# Patient Record
Sex: FEMALE | Race: WHITE | URBAN - METROPOLITAN AREA
[De-identification: names, ages, dates, MRNs, and addresses within clinical notes are randomized per-mention and may not be internally consistent; named-entity substitution may affect disease eponyms.]

---

## 2018-08-20 ENCOUNTER — EMERGENCY (EMERGENCY)
Facility: HOSPITAL | Age: 28
LOS: 0 days | Discharge: HOME | End: 2018-08-20
Attending: EMERGENCY MEDICINE | Admitting: EMERGENCY MEDICINE

## 2018-08-20 VITALS
HEART RATE: 66 BPM | RESPIRATION RATE: 18 BRPM | SYSTOLIC BLOOD PRESSURE: 101 MMHG | DIASTOLIC BLOOD PRESSURE: 54 MMHG | OXYGEN SATURATION: 100 % | TEMPERATURE: 97 F

## 2018-08-20 VITALS
HEART RATE: 72 BPM | TEMPERATURE: 97 F | DIASTOLIC BLOOD PRESSURE: 67 MMHG | OXYGEN SATURATION: 100 % | RESPIRATION RATE: 20 BRPM | SYSTOLIC BLOOD PRESSURE: 121 MMHG

## 2018-08-20 DIAGNOSIS — R55 SYNCOPE AND COLLAPSE: ICD-10-CM

## 2018-08-20 DIAGNOSIS — R06.02 SHORTNESS OF BREATH: ICD-10-CM

## 2018-08-20 LAB
ALBUMIN SERPL ELPH-MCNC: 4.7 G/DL — SIGNIFICANT CHANGE UP (ref 3.5–5.2)
ALP SERPL-CCNC: 84 U/L — SIGNIFICANT CHANGE UP (ref 30–115)
ALT FLD-CCNC: 17 U/L — SIGNIFICANT CHANGE UP (ref 0–41)
ANION GAP SERPL CALC-SCNC: 14 MMOL/L — SIGNIFICANT CHANGE UP (ref 7–14)
APPEARANCE UR: CLEAR — SIGNIFICANT CHANGE UP
AST SERPL-CCNC: 20 U/L — SIGNIFICANT CHANGE UP (ref 0–41)
BASOPHILS # BLD AUTO: 0.04 K/UL — SIGNIFICANT CHANGE UP (ref 0–0.2)
BASOPHILS NFR BLD AUTO: 0.3 % — SIGNIFICANT CHANGE UP (ref 0–1)
BILIRUB SERPL-MCNC: 0.2 MG/DL — SIGNIFICANT CHANGE UP (ref 0.2–1.2)
BILIRUB UR-MCNC: NEGATIVE — SIGNIFICANT CHANGE UP
BUN SERPL-MCNC: 16 MG/DL — SIGNIFICANT CHANGE UP (ref 10–20)
CALCIUM SERPL-MCNC: 9.2 MG/DL — SIGNIFICANT CHANGE UP (ref 8.5–10.1)
CHLORIDE SERPL-SCNC: 101 MMOL/L — SIGNIFICANT CHANGE UP (ref 98–110)
CO2 SERPL-SCNC: 22 MMOL/L — SIGNIFICANT CHANGE UP (ref 17–32)
COLOR SPEC: YELLOW — SIGNIFICANT CHANGE UP
CREAT SERPL-MCNC: 0.8 MG/DL — SIGNIFICANT CHANGE UP (ref 0.7–1.5)
D DIMER BLD IA.RAPID-MCNC: 127 NG/ML DDU — SIGNIFICANT CHANGE UP (ref 0–230)
DIFF PNL FLD: NEGATIVE — SIGNIFICANT CHANGE UP
EOSINOPHIL # BLD AUTO: 0.01 K/UL — SIGNIFICANT CHANGE UP (ref 0–0.7)
EOSINOPHIL NFR BLD AUTO: 0.1 % — SIGNIFICANT CHANGE UP (ref 0–8)
GLUCOSE SERPL-MCNC: 90 MG/DL — SIGNIFICANT CHANGE UP (ref 70–99)
GLUCOSE UR QL: NEGATIVE MG/DL — SIGNIFICANT CHANGE UP
HCT VFR BLD CALC: 42 % — SIGNIFICANT CHANGE UP (ref 37–47)
HGB BLD-MCNC: 14.1 G/DL — SIGNIFICANT CHANGE UP (ref 12–16)
IMM GRANULOCYTES NFR BLD AUTO: 0.7 % — HIGH (ref 0.1–0.3)
KETONES UR-MCNC: NEGATIVE — SIGNIFICANT CHANGE UP
LEUKOCYTE ESTERASE UR-ACNC: NEGATIVE — SIGNIFICANT CHANGE UP
LYMPHOCYTES # BLD AUTO: 1.01 K/UL — LOW (ref 1.2–3.4)
LYMPHOCYTES # BLD AUTO: 6.9 % — LOW (ref 20.5–51.1)
MCHC RBC-ENTMCNC: 27.7 PG — SIGNIFICANT CHANGE UP (ref 27–31)
MCHC RBC-ENTMCNC: 33.6 G/DL — SIGNIFICANT CHANGE UP (ref 32–37)
MCV RBC AUTO: 82.5 FL — SIGNIFICANT CHANGE UP (ref 81–99)
MONOCYTES # BLD AUTO: 0.52 K/UL — SIGNIFICANT CHANGE UP (ref 0.1–0.6)
MONOCYTES NFR BLD AUTO: 3.6 % — SIGNIFICANT CHANGE UP (ref 1.7–9.3)
NEUTROPHILS # BLD AUTO: 12.89 K/UL — HIGH (ref 1.4–6.5)
NEUTROPHILS NFR BLD AUTO: 88.4 % — HIGH (ref 42.2–75.2)
NITRITE UR-MCNC: NEGATIVE — SIGNIFICANT CHANGE UP
NRBC # BLD: 0 /100 WBCS — SIGNIFICANT CHANGE UP (ref 0–0)
NT-PROBNP SERPL-SCNC: 31 PG/ML — SIGNIFICANT CHANGE UP (ref 0–300)
PH UR: 6 — SIGNIFICANT CHANGE UP (ref 5–8)
PLATELET # BLD AUTO: 279 K/UL — SIGNIFICANT CHANGE UP (ref 130–400)
POTASSIUM SERPL-MCNC: 4.2 MMOL/L — SIGNIFICANT CHANGE UP (ref 3.5–5)
POTASSIUM SERPL-SCNC: 4.2 MMOL/L — SIGNIFICANT CHANGE UP (ref 3.5–5)
PROT SERPL-MCNC: 8.1 G/DL — HIGH (ref 6–8)
PROT UR-MCNC: NEGATIVE MG/DL — SIGNIFICANT CHANGE UP
RBC # BLD: 5.09 M/UL — SIGNIFICANT CHANGE UP (ref 4.2–5.4)
RBC # FLD: 12.4 % — SIGNIFICANT CHANGE UP (ref 11.5–14.5)
SODIUM SERPL-SCNC: 137 MMOL/L — SIGNIFICANT CHANGE UP (ref 135–146)
SP GR SPEC: 1.02 — SIGNIFICANT CHANGE UP (ref 1.01–1.03)
TROPONIN T SERPL-MCNC: <0.01 NG/ML — SIGNIFICANT CHANGE UP
UROBILINOGEN FLD QL: 0.2 MG/DL — SIGNIFICANT CHANGE UP (ref 0.2–0.2)
WBC # BLD: 14.57 K/UL — HIGH (ref 4.8–10.8)
WBC # FLD AUTO: 14.57 K/UL — HIGH (ref 4.8–10.8)

## 2018-08-20 RX ORDER — ONDANSETRON 8 MG/1
4 TABLET, FILM COATED ORAL ONCE
Qty: 0 | Refills: 0 | Status: COMPLETED | OUTPATIENT
Start: 2018-08-20 | End: 2018-08-20

## 2018-08-20 RX ORDER — SODIUM CHLORIDE 9 MG/ML
1000 INJECTION INTRAMUSCULAR; INTRAVENOUS; SUBCUTANEOUS ONCE
Qty: 0 | Refills: 0 | Status: COMPLETED | OUTPATIENT
Start: 2018-08-20 | End: 2018-08-20

## 2018-08-20 RX ORDER — ACETAMINOPHEN 500 MG
650 TABLET ORAL ONCE
Qty: 0 | Refills: 0 | Status: COMPLETED | OUTPATIENT
Start: 2018-08-20 | End: 2018-08-20

## 2018-08-20 RX ORDER — SODIUM CHLORIDE 9 MG/ML
1000 INJECTION, SOLUTION INTRAVENOUS ONCE
Qty: 0 | Refills: 0 | Status: COMPLETED | OUTPATIENT
Start: 2018-08-20 | End: 2018-08-20

## 2018-08-20 RX ADMIN — Medication 650 MILLIGRAM(S): at 11:32

## 2018-08-20 RX ADMIN — ONDANSETRON 4 MILLIGRAM(S): 8 TABLET, FILM COATED ORAL at 11:02

## 2018-08-20 RX ADMIN — SODIUM CHLORIDE 1000 MILLILITER(S): 9 INJECTION INTRAMUSCULAR; INTRAVENOUS; SUBCUTANEOUS at 11:32

## 2018-08-20 RX ADMIN — SODIUM CHLORIDE 1000 MILLILITER(S): 9 INJECTION, SOLUTION INTRAVENOUS at 13:02

## 2018-08-20 RX ADMIN — SODIUM CHLORIDE 1000 MILLILITER(S): 9 INJECTION INTRAMUSCULAR; INTRAVENOUS; SUBCUTANEOUS at 11:02

## 2018-08-20 NOTE — ED PROVIDER NOTE - OBJECTIVE STATEMENT
pt is a 28 yof with no pmhx presenting with syncope while sitting down at 830 this morning. for the past 2-3 weeks pt has been feeling more fatigued, SOB while in [place]. Pt returned from [place] 1 week ago. sx started to worsen, and this morning pt felt a prodrome, and passed out, slumped over in a chair. No head trauma. Pt woke up and immediately had an episode of nonbloody vomit, so she came in today.

## 2018-08-20 NOTE — ED PROVIDER NOTE - MEDICAL DECISION MAKING DETAILS
29 Y/O F NO SIG PMHX S/P SYNCOPE TODAY. LABS REVIEWED. EKG NORMAL. PT IMPROVED CLINICALLY. CASE D/W PMD AND PT TO FOLLOW UP THIS WEEK.

## 2018-08-20 NOTE — ED PROVIDER NOTE - NS ED ROS FT
Eyes:  No visual changes, eye pain or discharge.  ENMT:  No hearing changes, pain, discharge or infections. No neck pain or stiffness.  Cardiac:  No chest pain, SOB or edema. No chest pain with exertion.  Respiratory:  +SOB, -cough  GI:  +vomit - diarrhea  :  No dysuria, frequency or burning.  MS:  No myalgia, muscle weakness, joint pain or back pain.  Neuro:  +headache +weakness +LOC - head trauma  Skin:  No skin rash.   Endocrine: No history of thyroid disease or diabetes.

## 2018-08-20 NOTE — ED ADULT NURSE NOTE - NSIMPLEMENTINTERV_GEN_ALL_ED
Implemented All Universal Safety Interventions:  [place] to call system. Call bell, personal items and telephone within reach. Instruct patient to call for assistance. Room bathroom lighting operational. Non-slip footwear when patient is off stretcher. Physically safe environment: no spills, clutter or unnecessary equipment. Stretcher in lowest position, wheels locked, appropriate side rails in place.

## 2018-08-20 NOTE — ED PROVIDER NOTE - PROGRESS NOTE DETAILS
I personally evaluated the patient. I reviewed the Resident’s or Physician Assistant’s note (as assigned above), and agree with the findings and plan except as documented in my note.  27 Y/O F NO SIG PMHX C/O SYNCOPE TODAY. PT WAS SEATED IN A CHAIR AND BECAME ACUTELY WEAK AND THEN SYNCOPIZED AND FELL TO THE GROUND. PT C/O WEAKNESS X 3 MONTHS. + FATIGUE. PT C/O 2-3 MONTHS OF INTERMITTENT L SIDED CP. CP IS NOT PLEURITIC OR EXERTIONAL. NO SOB OR ROMERO. NO PALPUTATIONS. NO SEIZURE LIKE ACTIVITY WITH SYNCOPE TODAY. NO INCONTINENCE. NO COUGH, FEVER, CHILLS. PT WITH H/O ? RLE VEIN SURGERY. PT RETURNED [place] ONE WEEK AGO. NONSMOKER. NO WEIGHT LOSS. NO LEG EDEMA. PT REPORTS INTERMITTENT R THIGH PAIN AT SITE OF PRIOR SURGERY. VITALS NOTED. ALERT OX3 NAD OP NORMAL. MMM. PERRL. EOMI. NECK SUPPLE. NO JVD. LUNGS CLEAR B/L. RRR S1S2. ABD- SOFT NONTENDER. BACK NONTENDER. Spoke with Dr Sanford. Pt has a hx of passing out during physical exams and history taking. Dr Sanford will see pt in the AM. Pt in ED feeling better after bolus of NS. negative D-Dimer, negative EKG. PT REPORTS FEELING MUCH BETTER. CASE D/W DR. DONNELLY, WILL FOLLOW UP PT IN THE OFFICE. PT AWARE AND WILL SCHEDULE AN APPT. PT GIVEN RETURN INSTRUCTIONS. FAMILY A BEDSIDE.

## 2018-08-20 NOTE — ED PROVIDER NOTE - PHYSICAL EXAMINATION
CONSTITUTIONAL: Somnolent, in no acute distress  SKIN: warm, dry  HEAD: Normocephalic; atraumatic.  EYES: PERRL, EOMI, normal sclera and conjunctiva   ENT: No nasal discharge; airway clear.  NECK: Supple; non tender.  CARD: S1, S2 normal; no murmurs, gallops, or rubs. Regular rate and rhythm.   RESP: No wheezes, rales or rhonchi.  ABD: soft ntnd  EXT: Normal ROM.  No clubbing, cyanosis or edema.   LYMPH: No acute cervical adenopathy.  NEURO: Alert, oriented, grossly unremarkable  PSYCH: Cooperative, appropriate.

## 2021-06-23 ENCOUNTER — TRANSCRIPTION ENCOUNTER (OUTPATIENT)
Age: 31
End: 2021-06-23

## 2021-12-13 ENCOUNTER — TRANSCRIPTION ENCOUNTER (OUTPATIENT)
Age: 31
End: 2021-12-13

## 2022-02-26 ENCOUNTER — EMERGENCY (EMERGENCY)
Facility: HOSPITAL | Age: 32
LOS: 0 days | Discharge: HOME | End: 2022-02-27
Attending: EMERGENCY MEDICINE | Admitting: EMERGENCY MEDICINE
Payer: COMMERCIAL

## 2022-02-26 VITALS
SYSTOLIC BLOOD PRESSURE: 117 MMHG | RESPIRATION RATE: 18 BRPM | WEIGHT: 156.09 LBS | TEMPERATURE: 98 F | OXYGEN SATURATION: 99 % | HEART RATE: 85 BPM | DIASTOLIC BLOOD PRESSURE: 73 MMHG

## 2022-02-26 DIAGNOSIS — R10.30 LOWER ABDOMINAL PAIN, UNSPECIFIED: ICD-10-CM

## 2022-02-26 DIAGNOSIS — R11.0 NAUSEA: ICD-10-CM

## 2022-02-26 DIAGNOSIS — N85.00 ENDOMETRIAL HYPERPLASIA, UNSPECIFIED: ICD-10-CM

## 2022-02-26 DIAGNOSIS — L29.2 PRURITUS VULVAE: ICD-10-CM

## 2022-02-26 LAB
ALBUMIN SERPL ELPH-MCNC: 4.4 G/DL — SIGNIFICANT CHANGE UP (ref 3.5–5.2)
ALP SERPL-CCNC: 93 U/L — SIGNIFICANT CHANGE UP (ref 30–115)
ALT FLD-CCNC: 16 U/L — SIGNIFICANT CHANGE UP (ref 0–41)
ANION GAP SERPL CALC-SCNC: 15 MMOL/L — HIGH (ref 7–14)
APPEARANCE UR: CLEAR — SIGNIFICANT CHANGE UP
AST SERPL-CCNC: 20 U/L — SIGNIFICANT CHANGE UP (ref 0–41)
BASOPHILS # BLD AUTO: 0.03 K/UL — SIGNIFICANT CHANGE UP (ref 0–0.2)
BASOPHILS NFR BLD AUTO: 0.3 % — SIGNIFICANT CHANGE UP (ref 0–1)
BILIRUB DIRECT SERPL-MCNC: <0.2 MG/DL — SIGNIFICANT CHANGE UP (ref 0–0.3)
BILIRUB INDIRECT FLD-MCNC: SIGNIFICANT CHANGE UP MG/DL (ref 0.2–1.2)
BILIRUB SERPL-MCNC: <0.2 MG/DL — SIGNIFICANT CHANGE UP (ref 0.2–1.2)
BILIRUB UR-MCNC: NEGATIVE — SIGNIFICANT CHANGE UP
BUN SERPL-MCNC: 14 MG/DL — SIGNIFICANT CHANGE UP (ref 10–20)
CALCIUM SERPL-MCNC: 9.2 MG/DL — SIGNIFICANT CHANGE UP (ref 8.5–10.1)
CHLORIDE SERPL-SCNC: 104 MMOL/L — SIGNIFICANT CHANGE UP (ref 98–110)
CO2 SERPL-SCNC: 19 MMOL/L — SIGNIFICANT CHANGE UP (ref 17–32)
COLOR SPEC: SIGNIFICANT CHANGE UP
CREAT SERPL-MCNC: 0.8 MG/DL — SIGNIFICANT CHANGE UP (ref 0.7–1.5)
DIFF PNL FLD: NEGATIVE — SIGNIFICANT CHANGE UP
EOSINOPHIL # BLD AUTO: 0.09 K/UL — SIGNIFICANT CHANGE UP (ref 0–0.7)
EOSINOPHIL NFR BLD AUTO: 1 % — SIGNIFICANT CHANGE UP (ref 0–8)
GLUCOSE SERPL-MCNC: 93 MG/DL — SIGNIFICANT CHANGE UP (ref 70–99)
GLUCOSE UR QL: NEGATIVE — SIGNIFICANT CHANGE UP
HCG SERPL QL: NEGATIVE — SIGNIFICANT CHANGE UP
HCT VFR BLD CALC: 39.3 % — SIGNIFICANT CHANGE UP (ref 37–47)
HGB BLD-MCNC: 13.2 G/DL — SIGNIFICANT CHANGE UP (ref 12–16)
IMM GRANULOCYTES NFR BLD AUTO: 0.2 % — SIGNIFICANT CHANGE UP (ref 0.1–0.3)
KETONES UR-MCNC: NEGATIVE — SIGNIFICANT CHANGE UP
LACTATE SERPL-SCNC: 0.7 MMOL/L — SIGNIFICANT CHANGE UP (ref 0.7–2)
LEUKOCYTE ESTERASE UR-ACNC: NEGATIVE — SIGNIFICANT CHANGE UP
LIDOCAIN IGE QN: 36 U/L — SIGNIFICANT CHANGE UP (ref 7–60)
LYMPHOCYTES # BLD AUTO: 2.68 K/UL — SIGNIFICANT CHANGE UP (ref 1.2–3.4)
LYMPHOCYTES # BLD AUTO: 29.6 % — SIGNIFICANT CHANGE UP (ref 20.5–51.1)
MCHC RBC-ENTMCNC: 28 PG — SIGNIFICANT CHANGE UP (ref 27–31)
MCHC RBC-ENTMCNC: 33.6 G/DL — SIGNIFICANT CHANGE UP (ref 32–37)
MCV RBC AUTO: 83.3 FL — SIGNIFICANT CHANGE UP (ref 81–99)
MONOCYTES # BLD AUTO: 0.73 K/UL — HIGH (ref 0.1–0.6)
MONOCYTES NFR BLD AUTO: 8.1 % — SIGNIFICANT CHANGE UP (ref 1.7–9.3)
NEUTROPHILS # BLD AUTO: 5.49 K/UL — SIGNIFICANT CHANGE UP (ref 1.4–6.5)
NEUTROPHILS NFR BLD AUTO: 60.8 % — SIGNIFICANT CHANGE UP (ref 42.2–75.2)
NITRITE UR-MCNC: NEGATIVE — SIGNIFICANT CHANGE UP
NRBC # BLD: 0 /100 WBCS — SIGNIFICANT CHANGE UP (ref 0–0)
PH UR: 8 — SIGNIFICANT CHANGE UP (ref 5–8)
PLATELET # BLD AUTO: 255 K/UL — SIGNIFICANT CHANGE UP (ref 130–400)
POTASSIUM SERPL-MCNC: 4.1 MMOL/L — SIGNIFICANT CHANGE UP (ref 3.5–5)
POTASSIUM SERPL-SCNC: 4.1 MMOL/L — SIGNIFICANT CHANGE UP (ref 3.5–5)
PROT SERPL-MCNC: 7.6 G/DL — SIGNIFICANT CHANGE UP (ref 6–8)
PROT UR-MCNC: SIGNIFICANT CHANGE UP
RBC # BLD: 4.72 M/UL — SIGNIFICANT CHANGE UP (ref 4.2–5.4)
RBC # FLD: 12.3 % — SIGNIFICANT CHANGE UP (ref 11.5–14.5)
SODIUM SERPL-SCNC: 138 MMOL/L — SIGNIFICANT CHANGE UP (ref 135–146)
SP GR SPEC: 1.02 — SIGNIFICANT CHANGE UP (ref 1.01–1.03)
UROBILINOGEN FLD QL: SIGNIFICANT CHANGE UP
WBC # BLD: 9.04 K/UL — SIGNIFICANT CHANGE UP (ref 4.8–10.8)
WBC # FLD AUTO: 9.04 K/UL — SIGNIFICANT CHANGE UP (ref 4.8–10.8)

## 2022-02-26 PROCEDURE — 99285 EMERGENCY DEPT VISIT HI MDM: CPT

## 2022-02-26 RX ORDER — CEFTRIAXONE 500 MG/1
500 INJECTION, POWDER, FOR SOLUTION INTRAMUSCULAR; INTRAVENOUS ONCE
Refills: 0 | Status: COMPLETED | OUTPATIENT
Start: 2022-02-26 | End: 2022-02-26

## 2022-02-26 RX ORDER — SODIUM CHLORIDE 9 MG/ML
1000 INJECTION INTRAMUSCULAR; INTRAVENOUS; SUBCUTANEOUS ONCE
Refills: 0 | Status: COMPLETED | OUTPATIENT
Start: 2022-02-26 | End: 2022-02-26

## 2022-02-26 NOTE — ED ADULT NURSE NOTE - OBJECTIVE STATEMENT
Abdominal pain x 12 days. denies any radiation. Airway patent with neg respiratory distress. aromx 4 extremities. Denies any injuries. denies any chest pain or palpitations. Denies any dizziness or vomiting.

## 2022-02-27 PROCEDURE — 76830 TRANSVAGINAL US NON-OB: CPT | Mod: 26,59

## 2022-02-27 PROCEDURE — 74177 CT ABD & PELVIS W/CONTRAST: CPT | Mod: 26,MA

## 2022-02-27 RX ORDER — KETOROLAC TROMETHAMINE 30 MG/ML
15 SYRINGE (ML) INJECTION ONCE
Refills: 0 | Status: DISCONTINUED | OUTPATIENT
Start: 2022-02-27 | End: 2022-02-27

## 2022-02-27 RX ADMIN — CEFTRIAXONE 500 MILLIGRAM(S): 500 INJECTION, POWDER, FOR SOLUTION INTRAMUSCULAR; INTRAVENOUS at 00:46

## 2022-02-27 RX ADMIN — Medication 15 MILLIGRAM(S): at 01:10

## 2022-02-27 RX ADMIN — SODIUM CHLORIDE 1000 MILLILITER(S): 9 INJECTION INTRAMUSCULAR; INTRAVENOUS; SUBCUTANEOUS at 00:46

## 2022-02-27 RX ADMIN — Medication 15 MILLIGRAM(S): at 01:15

## 2022-02-27 RX ADMIN — Medication 100 MILLIGRAM(S): at 00:46

## 2022-02-27 NOTE — ED PROVIDER NOTE - PROGRESS NOTE DETAILS
results discussed with patient - educated on importance of fu with gyn and primary care doctor. patient to fu with gyn for outpatient rpt transvaginal us. patient verbalizes understanding. patient given doxy and ceftriaxone for std ppx.

## 2022-02-27 NOTE — ED PROVIDER NOTE - NS ED ROS FT
Review of Systems:  	•	CONSTITUTIONAL: no fever  	•	SKIN: no rash   	•	EYES: no eye pain, no blurry vision  	•	ENT: no sore throat  	•	RESPIRATORY: no shortness of breath, no cough  	•	CARDIAC: no chest pain, no palpitations  	•	GI: +abd pain, +nausea, no vomiting/diarrhea/constipation  	•	GENITO-URINARY: no discharge, no dysuria; no hematuria, no increased urinary frequency  	•	MUSCULOSKELETAL: no joint paint, no swelling, no redness  	•	NEUROLOGIC: no weakness

## 2022-02-27 NOTE — ED PROVIDER NOTE - CARE PROVIDER_API CALL
Jose Luis Werner)  Obstetrics and Gynecology  [address] [place] [address]  Phone: [phone]  Fax: [phone]  Follow Up Time: 1-3 Days

## 2022-02-27 NOTE — ED PROVIDER NOTE - NSFOLLOWUPCLINICS_GEN_ALL_ED_FT
[facility]  OB/GYN  [address] [place] [address]  Phone: [phone]  Fax:   Follow Up Time: 4-6 Days

## 2022-02-27 NOTE — ED PROVIDER NOTE - ATTENDING CONTRIBUTION TO CARE
Patient is c/o abd pain, bloating sensation, nausea and feels her abd is distended. Denies cp/sob/syncope/trauma.   Vitals reviewed.   Patient is awake, alert, answering questions appropriately, appears uncomfortable, but not in distress.  Lungs: CTA, no wheezing, no crackles.  Abd: +BS, +periumbilical tenderness, ND, soft, no rebound, no guarding.   CNS: awake, alert, o x 3, no focal neurologic deficits.  A/P: Abd pain/nausea,   labs, imaging,  symptomatic treatment as needed,   reevaluation.

## 2022-02-27 NOTE — ED PROVIDER NOTE - NSFOLLOWUPINSTRUCTIONS_ED_ALL_ED_FT
Please follow up with your primary care doctor and GYN for repeat ultrasound in 1-2 weeks  Please be aware of any new or worsening signs or symptoms that should prompt your return to the ER.      Abdominal Pain, Adult     Many things can cause belly (abdominal) pain. Most times, belly pain is not dangerous. Many cases of belly pain can be watched and treated at home. Sometimes belly pain is serious, though. Your doctor will try to find the cause of your belly pain.  Follow these instructions at home:  Take over-the-counter and prescription medicines only as told by your doctor. Do not take medicines that help you poop (laxatives) unless told to by your doctor.Drink enough fluid to keep your pee (urine) clear or pale yellow.Watch your belly pain for any changes.Keep all follow-up visits as told by your doctor. This is important.Contact a doctor if:  Your belly pain changes or gets worse.You are not hungry, or you lose weight without trying.You are having trouble pooping (constipated) or have watery poop (diarrhea) for more than 2–3 days.You have pain when you pee or poop.Your belly pain wakes you up at night.Your pain gets worse with meals, after eating, or with certain foods.You are throwing up and cannot keep anything down.You have a fever.Get help right away if:  Your pain does not go away as soon as your doctor says it should.You cannot stop throwing up.Your pain is only in areas of your belly, such as the right side or the left lower part of the belly.You have bloody or black poop, or poop that looks like tar.You have very bad pain, cramping, or bloating in your belly.You have signs of not having enough fluid or water in your body (dehydration), such as:  Dark pee, very little pee, or no pee.Cracked lips.Dry mouth.Sunken eyes.Sleepiness.Weakness.This information is not intended to replace advice given to you by your health care provider. Make sure you discuss any questions you have with your health care provider.    Document Released: 06/05/2009 Document Revised: 07/07/2017 Document Reviewed: 05/31/2017  [employer] Interactive Patient Education © 2019 [employer]

## 2022-02-27 NOTE — ED PROVIDER NOTE - OBJECTIVE STATEMENT
32 year old female, no past medical history, who presents with abd pain. patient endorses lower abd pain with associated abdominal bloating x2 weeks. patient reports intermittent vaginal itching. denies fever, chills, nausea/vomiting/diarrhea, urinary symptoms, vaginal bleeding/discharge. no hx sti/stds. patient sexually active, one partner.

## 2022-02-27 NOTE — ED PROVIDER NOTE - PHYSICAL EXAMINATION
CONSTITUTIONAL: Well-developed; well-nourished; in no acute distress, nontoxic appearing  SKIN: skin exam is warm and dry  RESP: No wheezes, rales or rhonchi. Good air movement bilaterally  ABD: soft; non-distended; non-tender. No Rebound, No guarding  : Speculum: no vaginal discharge/bleeding. Bimanual: no adnexal ttp, no cmt. Chaperone: PCA A  NEURO: awake, alert, following commands, oriented, grossly unremarkable. No Focal deficits. GCS 15.   PSYCH: Cooperative, appropriate.

## 2022-02-28 LAB
C TRACH RRNA SPEC QL NAA+PROBE: SIGNIFICANT CHANGE UP
CULTURE RESULTS: SIGNIFICANT CHANGE UP
N GONORRHOEA RRNA SPEC QL NAA+PROBE: SIGNIFICANT CHANGE UP
SPECIMEN SOURCE: SIGNIFICANT CHANGE UP
SPECIMEN SOURCE: SIGNIFICANT CHANGE UP

## 2022-05-05 ENCOUNTER — NON-APPOINTMENT (OUTPATIENT)
Age: 32
End: 2022-05-05

## 2022-06-06 ENCOUNTER — NON-APPOINTMENT (OUTPATIENT)
Age: 32
End: 2022-06-06

## 2022-09-01 ENCOUNTER — NON-APPOINTMENT (OUTPATIENT)
Age: 32
End: 2022-09-01

## 2022-09-06 ENCOUNTER — NON-APPOINTMENT (OUTPATIENT)
Age: 32
End: 2022-09-06

## 2022-10-15 ENCOUNTER — EMERGENCY (EMERGENCY)
Facility: HOSPITAL | Age: 32
LOS: 0 days | Discharge: HOME | End: 2022-10-15
Attending: EMERGENCY MEDICINE | Admitting: EMERGENCY MEDICINE

## 2022-10-15 VITALS
TEMPERATURE: 98 F | DIASTOLIC BLOOD PRESSURE: 63 MMHG | HEART RATE: 75 BPM | SYSTOLIC BLOOD PRESSURE: 107 MMHG | HEIGHT: 65 IN | WEIGHT: 156.97 LBS | OXYGEN SATURATION: 99 % | RESPIRATION RATE: 18 BRPM

## 2022-10-15 DIAGNOSIS — R53.1 WEAKNESS: ICD-10-CM

## 2022-10-15 DIAGNOSIS — R53.83 OTHER FATIGUE: ICD-10-CM

## 2022-10-15 DIAGNOSIS — R42 DIZZINESS AND GIDDINESS: ICD-10-CM

## 2022-10-15 DIAGNOSIS — L65.9 NONSCARRING HAIR LOSS, UNSPECIFIED: ICD-10-CM

## 2022-10-15 LAB
ALBUMIN SERPL ELPH-MCNC: 4.4 G/DL — SIGNIFICANT CHANGE UP (ref 3.5–5.2)
ALP SERPL-CCNC: 95 U/L — SIGNIFICANT CHANGE UP (ref 30–115)
ALT FLD-CCNC: 18 U/L — SIGNIFICANT CHANGE UP (ref 0–41)
ANION GAP SERPL CALC-SCNC: 12 MMOL/L — SIGNIFICANT CHANGE UP (ref 7–14)
APPEARANCE UR: CLEAR — SIGNIFICANT CHANGE UP
AST SERPL-CCNC: 23 U/L — SIGNIFICANT CHANGE UP (ref 0–41)
BASOPHILS # BLD AUTO: 0.03 K/UL — SIGNIFICANT CHANGE UP (ref 0–0.2)
BASOPHILS NFR BLD AUTO: 0.5 % — SIGNIFICANT CHANGE UP (ref 0–1)
BILIRUB SERPL-MCNC: 0.3 MG/DL — SIGNIFICANT CHANGE UP (ref 0.2–1.2)
BILIRUB UR-MCNC: NEGATIVE — SIGNIFICANT CHANGE UP
BUN SERPL-MCNC: 15 MG/DL — SIGNIFICANT CHANGE UP (ref 10–20)
CALCIUM SERPL-MCNC: 9 MG/DL — SIGNIFICANT CHANGE UP (ref 8.4–10.4)
CHLORIDE SERPL-SCNC: 104 MMOL/L — SIGNIFICANT CHANGE UP (ref 98–110)
CO2 SERPL-SCNC: 24 MMOL/L — SIGNIFICANT CHANGE UP (ref 17–32)
COLOR SPEC: YELLOW — SIGNIFICANT CHANGE UP
CREAT SERPL-MCNC: 0.7 MG/DL — SIGNIFICANT CHANGE UP (ref 0.7–1.5)
DIFF PNL FLD: NEGATIVE — SIGNIFICANT CHANGE UP
EGFR: 119 ML/MIN/1.73M2 — SIGNIFICANT CHANGE UP
EOSINOPHIL # BLD AUTO: 0.05 K/UL — SIGNIFICANT CHANGE UP (ref 0–0.7)
EOSINOPHIL NFR BLD AUTO: 0.9 % — SIGNIFICANT CHANGE UP (ref 0–8)
GLUCOSE SERPL-MCNC: 83 MG/DL — SIGNIFICANT CHANGE UP (ref 70–99)
GLUCOSE UR QL: NEGATIVE — SIGNIFICANT CHANGE UP
HCG SERPL QL: NEGATIVE — SIGNIFICANT CHANGE UP
HCT VFR BLD CALC: 41.2 % — SIGNIFICANT CHANGE UP (ref 37–47)
HGB BLD-MCNC: 13.6 G/DL — SIGNIFICANT CHANGE UP (ref 12–16)
IMM GRANULOCYTES NFR BLD AUTO: 0.4 % — HIGH (ref 0.1–0.3)
KETONES UR-MCNC: ABNORMAL
LEUKOCYTE ESTERASE UR-ACNC: NEGATIVE — SIGNIFICANT CHANGE UP
LYMPHOCYTES # BLD AUTO: 1.8 K/UL — SIGNIFICANT CHANGE UP (ref 1.2–3.4)
LYMPHOCYTES # BLD AUTO: 32.6 % — SIGNIFICANT CHANGE UP (ref 20.5–51.1)
MAGNESIUM SERPL-MCNC: 2 MG/DL — SIGNIFICANT CHANGE UP (ref 1.8–2.4)
MCHC RBC-ENTMCNC: 27.6 PG — SIGNIFICANT CHANGE UP (ref 27–31)
MCHC RBC-ENTMCNC: 33 G/DL — SIGNIFICANT CHANGE UP (ref 32–37)
MCV RBC AUTO: 83.6 FL — SIGNIFICANT CHANGE UP (ref 81–99)
MONOCYTES # BLD AUTO: 0.38 K/UL — SIGNIFICANT CHANGE UP (ref 0.1–0.6)
MONOCYTES NFR BLD AUTO: 6.9 % — SIGNIFICANT CHANGE UP (ref 1.7–9.3)
NEUTROPHILS # BLD AUTO: 3.24 K/UL — SIGNIFICANT CHANGE UP (ref 1.4–6.5)
NEUTROPHILS NFR BLD AUTO: 58.7 % — SIGNIFICANT CHANGE UP (ref 42.2–75.2)
NITRITE UR-MCNC: NEGATIVE — SIGNIFICANT CHANGE UP
NRBC # BLD: 0 /100 WBCS — SIGNIFICANT CHANGE UP (ref 0–0)
PH UR: 6.5 — SIGNIFICANT CHANGE UP (ref 5–8)
PLATELET # BLD AUTO: 354 K/UL — SIGNIFICANT CHANGE UP (ref 130–400)
POTASSIUM SERPL-MCNC: 4.2 MMOL/L — SIGNIFICANT CHANGE UP (ref 3.5–5)
POTASSIUM SERPL-SCNC: 4.2 MMOL/L — SIGNIFICANT CHANGE UP (ref 3.5–5)
PROT SERPL-MCNC: 7.7 G/DL — SIGNIFICANT CHANGE UP (ref 6–8)
PROT UR-MCNC: SIGNIFICANT CHANGE UP
RBC # BLD: 4.93 M/UL — SIGNIFICANT CHANGE UP (ref 4.2–5.4)
RBC # FLD: 12.7 % — SIGNIFICANT CHANGE UP (ref 11.5–14.5)
SODIUM SERPL-SCNC: 140 MMOL/L — SIGNIFICANT CHANGE UP (ref 135–146)
SP GR SPEC: 1.03 — SIGNIFICANT CHANGE UP (ref 1.01–1.03)
UROBILINOGEN FLD QL: SIGNIFICANT CHANGE UP
WBC # BLD: 5.52 K/UL — SIGNIFICANT CHANGE UP (ref 4.8–10.8)
WBC # FLD AUTO: 5.52 K/UL — SIGNIFICANT CHANGE UP (ref 4.8–10.8)

## 2022-10-15 PROCEDURE — 99284 EMERGENCY DEPT VISIT MOD MDM: CPT

## 2022-10-15 PROCEDURE — 71045 X-RAY EXAM CHEST 1 VIEW: CPT | Mod: 26

## 2022-10-15 RX ORDER — SODIUM CHLORIDE 9 MG/ML
1000 INJECTION, SOLUTION INTRAVENOUS ONCE
Refills: 0 | Status: COMPLETED | OUTPATIENT
Start: 2022-10-15 | End: 2022-10-15

## 2022-10-15 RX ADMIN — SODIUM CHLORIDE 1000 MILLILITER(S): 9 INJECTION, SOLUTION INTRAVENOUS at 20:57

## 2022-10-15 NOTE — ED PROVIDER NOTE - OBJECTIVE STATEMENT
Patient is a 32y F presenting for evaluation of 10 days of weakness, fatigue, dizziness when standing up Patient is a 32y F presenting for evaluation of 10 days of weakness, fatigue, dizziness when standing up. Patient is concerned because she has been having more hair fall out than normal.

## 2022-10-15 NOTE — ED PROVIDER NOTE - PATIENT PORTAL LINK FT
You can access the FollowMyHealth Patient Portal offered by [facility] by registering at the following website: [email or web address]. By joining [employer]’s FollowMyHealth portal, you will also be able to view your health information using other applications (apps) compatible with our system.

## 2022-10-15 NOTE — ED PROVIDER NOTE - CARE PROVIDER_API CALL
Kem Mercado  ENDOCRINOLOGY/METAB/DIABETES  [address] [place] [address]  Phone: [phone]  Fax: [phone]  Follow Up Time:

## 2022-10-15 NOTE — ED PROVIDER NOTE - PHYSICAL EXAMINATION
Vital Signs: I have reviewed the initial vital signs.  Constitutional: appears stated age, no acute distress.  HEENT: Airway patent, moist MM, EOMI, PERRLA.  CV: regular rate, regular rhythm, well-perfused extremities,   Lungs: Clear to ascultation bilaterally,   ABD: Non-tender, Non-distended,   MSK: Neck supple, nontender, normal range of motion,  INTEG: Skin warm, dry, no rash.  NEURO: A&Ox3, moving all extremities, normal speech  PSYCH: Calm, cooperative, normal affect and interaction.

## 2022-10-15 NOTE — ED PROVIDER NOTE - ADDITIONAL NOTES AND INSTRUCTIONS:
Patient was seen in our ED today. Can return to work / school from a medical standpoint on the date listed above or earlier if symptoms resolve.

## 2022-10-15 NOTE — ED PROVIDER NOTE - NS ED ROS FT
Review of Systems    Constitutional: (-) fever (+) fatigue  Cardiovascular: (-) chest pain, (-) syncope  Respiratory: (-) cough, (-) shortness of breath  Gastrointestinal: (-) vomiting, (-) diarrhea, (-) abdominal pain  Musculoskeletal: (-) neck pain, (-) back pain, (-) joint pain  Integumentary: (-) rash, (-) edema  Neurological: (-) headache, (-) altered mental status    Except as documented in the HPI, all other systems are negative.

## 2022-10-17 LAB
CULTURE RESULTS: SIGNIFICANT CHANGE UP
SPECIMEN SOURCE: SIGNIFICANT CHANGE UP
TSH SERPL-MCNC: 0.11 UIU/ML — LOW (ref 0.27–4.2)

## 2022-10-28 NOTE — ED POST DISCHARGE NOTE - DETAILS
Call placed to spouse, Ender Warren. Will have patient follow up with PMD. Call attempted to primary phone number in chart.

## 2023-02-07 ENCOUNTER — NON-APPOINTMENT (OUTPATIENT)
Age: 33
End: 2023-02-07

## 2023-02-13 NOTE — ED ADULT TRIAGE NOTE - PAIN RATING/NUMBER SCALE (0-10): ACTIVITY
Pt had labs done on 2/8 she would like the results please [phone]
Spoke with patient about results. Referral made to  [facility]. Pt verbalized understanding.
7

## 2023-05-24 ENCOUNTER — EMERGENCY (EMERGENCY)
Facility: HOSPITAL | Age: 33
LOS: 0 days | Discharge: HOME | End: 2023-05-24
Attending: EMERGENCY MEDICINE
Payer: COMMERCIAL

## 2023-05-24 ENCOUNTER — OUTPATIENT (OUTPATIENT)
Dept: OUTPATIENT SERVICES | Facility: HOSPITAL | Age: 33
LOS: 1 days | End: 2023-05-24
Payer: COMMERCIAL

## 2023-05-24 VITALS
OXYGEN SATURATION: 100 % | SYSTOLIC BLOOD PRESSURE: 101 MMHG | RESPIRATION RATE: 18 BRPM | DIASTOLIC BLOOD PRESSURE: 61 MMHG | HEART RATE: 63 BPM | WEIGHT: 134.92 LBS | HEIGHT: 65 IN | TEMPERATURE: 96 F

## 2023-05-24 DIAGNOSIS — R55 SYNCOPE AND COLLAPSE: ICD-10-CM

## 2023-05-24 DIAGNOSIS — N97.9 FEMALE INFERTILITY, UNSPECIFIED: ICD-10-CM

## 2023-05-24 DIAGNOSIS — Z00.8 ENCOUNTER FOR OTHER GENERAL EXAMINATION: ICD-10-CM

## 2023-05-24 LAB — GLUCOSE BLDC GLUCOMTR-MCNC: 118 MG/DL — HIGH (ref 70–99)

## 2023-05-24 PROCEDURE — 93005 ELECTROCARDIOGRAM TRACING: CPT

## 2023-05-24 PROCEDURE — 74740 X-RAY FEMALE GENITAL TRACT: CPT | Mod: 26

## 2023-05-24 PROCEDURE — 58340 CATHETER FOR HYSTEROGRAPHY: CPT

## 2023-05-24 PROCEDURE — 99283 EMERGENCY DEPT VISIT LOW MDM: CPT | Mod: 25

## 2023-05-24 PROCEDURE — 82962 GLUCOSE BLOOD TEST: CPT

## 2023-05-24 PROCEDURE — 99284 EMERGENCY DEPT VISIT MOD MDM: CPT

## 2023-05-24 PROCEDURE — 74740 X-RAY FEMALE GENITAL TRACT: CPT

## 2023-05-24 PROCEDURE — 93010 ELECTROCARDIOGRAM REPORT: CPT

## 2023-05-24 NOTE — ED PROVIDER NOTE - PHYSICAL EXAMINATION
CONST: Well appearing in NAD  EYES: PERRL, EOMI, Sclera and conjunctiva clear.  NECK: Non-tender, no meningeal signs  CARD: Normal S1 S2; Normal rate and rhythm  RESP: Equal BS B/L, No wheezes, rhonchi or rales. No distress  GI: Soft, non-tender, non-distended.  MS: Normal ROM in all extremities. No midline spinal tenderness.  SKIN: Warm, dry, no acute rashes. Good turgor  NEURO: A&Ox3, No focal deficits. Strength 5/5 with no sensory deficits. Steady gait

## 2023-05-24 NOTE — ED PROVIDER NOTE - PATIENT PORTAL LINK FT
You can access the FollowMyHealth Patient Portal offered by [facility] by registering at the following website: [email or web address]. By joining [employer]’s FollowMyHealth portal, you will also be able to view your health information using other applications (apps) compatible with our system.

## 2023-05-24 NOTE — ED PROVIDER NOTE - CARE PROVIDER_API CALL
Martín Curiel  Interventional Cardiology  [address] [place] [address], [address]  [place] [address]  Phone: [phone]  Fax: [phone]  Follow Up Time: 4-6 Days

## 2023-05-24 NOTE — ED ADULT NURSE NOTE - CHIEF COMPLAINT QUOTE
pt bibems from [facility] for syncopal episode after receiving intrauterine contrast. .

## 2023-05-24 NOTE — ED ADULT NURSE NOTE - OBJECTIVE STATEMENT
pt is a 32 yo female biba for syncope. pt sts was doing a test at doctor and syncopized while sitting down. denies hitting head, sts felt dizzy at the time. now denies any symptoms and sts wants to go home. no cp, sob.

## 2023-05-24 NOTE — ED PROVIDER NOTE - CLINICAL SUMMARY MEDICAL DECISION MAKING FREE TEXT BOX
33-year-old female present to the ED for syncopal episode during OB/GYN procedure.  Patient reports she felt nervous before passing out.  Denies any headache, chest pain, dizziness, shortness of breath.  Physical exam is unremarkable.  No focal neurological deficits noted.  Vitals reviewed by me noted to be within normal limits.  EKG unremarkable symptoms consistent with vasovagal syncope.  Overall well-appearing.  Discharged home.

## 2023-05-24 NOTE — ED PROVIDER NOTE - MDM PATIENT STATEMENT FOR ADDL TREATMENT
Patient with one or more new problems requiring additional work-up/treatment. Cyclophosphamide Counseling:  I discussed with the patient the risks of cyclophosphamide including but not limited to hair loss, hormonal abnormalities, decreased fertility, abdominal pain, diarrhea, nausea and vomiting, bone marrow suppression and infection. The patient understands that monitoring is required while taking this medication.

## 2023-05-24 NOTE — ED ADULT TRIAGE NOTE - GLASGOW COMA SCALE: SCORE, MLM
LM for patient to call and schedule imaging.      Per Trini Gonzalez, patient should just do oral hydration.      Results will be called to the patient.   15

## 2023-05-24 NOTE — ED PROVIDER NOTE - NS ED ATTENDING STATEMENT MOD
This was a shared visit with the DANIAL. I reviewed and verified the documentation and independently performed the documented:
regular rate and rhythm

## 2023-05-24 NOTE — ED ADULT TRIAGE NOTE - CHIEF COMPLAINT QUOTE
pt bibems from [facility] for syncopal episode after receiving intrauterine contrast. .

## 2023-05-24 NOTE — ED ADULT TRIAGE NOTE - NS ED NURSE AMBULANCES
Intralaminar Epidural Steroid Procedure Note        Patient Name   Jim Michaels    Date of Procedure: January 31, 2017    Preoperative Diagnosis: Lumbar stenosis [M48.06]    Postoperative Diagnosis: Same      Procedure:  Epidural Steroid Injection    Consent:  Informed consent was obtained prior to the procedure. In addition to the potential risks associated with the procedure itself, the patient was informed both verbally and in writing of the potential side effects of the use of glucocorticoid. The patient appeared to comprehend the informed consent and desired to have the procedure performed. Procedure in Detail:  The patient was taken to the procedure suite and placed in the prone position on the operating table on appropriate padding. The posterior lumbar region was prepped and draped in the usual sterile fashion. Intraoperative fluoroscopy was used to localize the L1-L2 interspace. The skin was infiltrated with 1% lidocaine. An 18-gauge Tuohy needle was advanced into the epidural space at L1-L2 under fluoroscopic guidance using the loss of resistance technique. No cerebrospinal fluid was seen throughout the procedure. Yes  A small amount of Isovue was injected into the epidural space, confirming appropriated needle placement on fluoroscopy. No vascular uptake was identified. Next, 2ml of 1% Lidocaine and 30mg of preservative free Dexamethasone were injected via the Tuohy needle. The needle was removed from the patient. The patient tolerated the procedure well and was discharged home with designated  and care instructions.       Signed By: Ric Atkinson MD                        January 31, 2017
[employer]

## 2023-05-25 DIAGNOSIS — N97.9 FEMALE INFERTILITY, UNSPECIFIED: ICD-10-CM

## 2023-06-14 NOTE — ED PROVIDER NOTE - PATIENT PORTAL LINK FT
negative
You can access the FollowMyHealth Patient Portal offered by [facility] by registering at the following website: [email or web address]. By joining [employer]’s FollowMyHealth portal, you will also be able to view your health information using other applications (apps) compatible with our system.

## 2023-07-23 NOTE — ED PROVIDER NOTE - CARE PLAN
Principal Discharge DX:	Abdominal pain  Secondary Diagnosis:	Endometrial thickening on ultrasound   1 Abdomen soft, non-tender, no guarding.

## 2024-04-30 NOTE — ED PROVIDER NOTE - NSPTACCESSSVCSAPPT_ED_ALL_ED
Pre-operative evaluation for Procedure(s) (LRB):  LYSIS, ADHESIONS, KNEE, ARTHROSCOPIC (Right)  SYNOVECTOMY, KNEE (Right)    Chelsea Rodriguez is a 70 y.o. female     Patient Active Problem List   Diagnosis    History of left breast cancer    History of adenomatous polyp of colon    Essential hypertension    Bilateral carpal tunnel syndrome    Refractive error    Vitreous detachment of both eyes    Dry eye syndrome of both eyes    Cortical cataract of both eyes    Senile nuclear sclerosis    PCO (posterior capsular opacification), bilateral    Pseudophakia    Varicose veins of lower extremity with edema, bilateral    Vitamin D deficiency    Hyperlipidemia    Obesity (BMI 30.0-34.9)    HENOK (obstructive sleep apnea)    H/O tooth extraction    Primary osteoarthritis of right knee    Venous stasis dermatitis    Lymphedema of both lower extremities    Pelvic floor dysfunction    Other lack of coordination    Acquired absence of both cervix and uterus    Anxiety    Osteoarthritis    Swelling of hand    Pain involving joint of finger of left hand    Muscle weakness    Multiple thyroid nodules    Thyroid nodule    Prediabetes       Review of patient's allergies indicates:   Allergen Reactions    Codeine Nausea And Vomiting       No current facility-administered medications on file prior to encounter.     Current Outpatient Medications on File Prior to Encounter   Medication Sig Dispense Refill    atorvastatin (LIPITOR) 80 MG tablet TAKE 1 TABLET (80 MG TOTAL) BY MOUTH ONCE DAILY. 90 tablet 3    ezetimibe (ZETIA) 10 mg tablet Take 1 tablet (10 mg total) by mouth once daily. 90 tablet 3    fluticasone propionate (FLONASE) 50 mcg/actuation nasal spray 1 spray by Each Nostril route.      hydroCHLOROthiazide (MICROZIDE) 12.5 mg capsule TAKE 1 CAPSULE EVERY EVENING 90 capsule 1    loratadine (CLARITIN) 10 mg tablet Take 1 tablet (10 mg total) by mouth once daily. 30 tablet 2    metoprolol succinate (TOPROL-XL) 25 MG 24 hr tablet TAKE  1 TABLET EVERY EVENING 90 tablet 3    multivitamin (THERAGRAN) per tablet Take 1 tablet by mouth once daily.      omega-3 fatty acids/fish oil (FISH OIL-OMEGA-3 FATTY ACIDS) 300-1,000 mg capsule Take by mouth once daily.      acetaminophen (TYLENOL) 650 MG TbSR Take 1 tablet (650 mg total) by mouth every 8 (eight) hours as needed (pain). 120 tablet 0    amoxicillin (AMOXIL) 500 MG capsule Take 500 mg by mouth as needed. Before dental work      azelastine (ASTELIN) 137 mcg (0.1 %) nasal spray 1 spray by Nasal route 2 (two) times daily.      coenzyme Q10 100 mg capsule Take 100 mg by mouth every evening.      diclofenac (VOLTAREN) 25 MG TbEC Take 1 tablet (25 mg total) by mouth 2 (two) times daily as needed. 20 tablet 0    docusate sodium (COLACE) 100 MG capsule Take 1 capsule (100 mg total) by mouth 2 (two) times daily as needed for Constipation. 60 capsule 0    irbesartan (AVAPRO) 150 MG tablet TAKE 1 TABLET ONE TIME DAILY 90 tablet 3    Lactobacillus acidophilus (PROBIOTIC ACIDOPHILUS ORAL) Take by mouth.      metFORMIN (GLUCOPHAGE-XR) 500 MG ER 24hr tablet Take 1 tablet (500 mg total) by mouth daily with breakfast. 90 tablet 0       Past Surgical History:   Procedure Laterality Date    BILATERAL SALPINGOOPHORECTOMY      BREAST BIOPSY Left     malignant    BREAST BIOPSY Left     negative    BREAST LUMPECTOMY Left     CATARACT EXTRACTION W/  INTRAOCULAR LENS IMPLANT Right 2018    Dr. Oneil    CATARACT EXTRACTION W/  INTRAOCULAR LENS IMPLANT Left 2018    Dr. Oneil     SECTION      x2    COLONOSCOPY N/A 10/20/2017    Procedure: COLONOSCOPY;  Surgeon: Mitchell Danielson Jr., MD;  Location: [facility] ENDO;  Service: Endoscopy;  Laterality: N/A;    COLONOSCOPY N/A 2021    Procedure: COLONOSCOPY/Suprep;  Surgeon: Malcolm Reyes MD;  Location: [facility] ENDO;  Service: Endoscopy;  Laterality: N/A;    CYST REMOVAL      on back    ESOPHAGOGASTRODUODENOSCOPY N/A 2021     Procedure: EGD (ESOPHAGOGASTRODUODENOSCOPY);  Surgeon: Malcolm Reyes MD;  Location: [facility] ENDO;  Service: Endoscopy;  Laterality: N/A;    HERNIA REPAIR      HYSTERECTOMY      at 26 yrs old    INTRAOCULAR PROSTHESES INSERTION Left 11/06/2018    Procedure: INSERTION, IOL PROSTHESIS;  Surgeon: Sergio Oneil MD;  Location: [facility] OR 1ST FLR;  Service: Ophthalmology;  Laterality: Left;    INTRAOCULAR PROSTHESES INSERTION Right 11/20/2018    Procedure: INSERTION, IOL PROSTHESIS;  Surgeon: Sergio Oneil MD;  Location: [facility] OR 2ND FLR;  Service: Ophthalmology;  Laterality: Right;    KNEE ARTHROPLASTY Right 03/31/2021    Procedure: ARTHROPLASTY, KNEE:RIGHT:DEPUY-SIGMA ;  Surgeon: Pedro Summers III, MD;  Location: [facility] OR;  Service: Orthopedics;  Laterality: Right;    OOPHORECTOMY      @ 46 yrs old    PHACOEMULSIFICATION OF CATARACT Left 11/06/2018    Procedure: PHACOEMULSIFICATION, CATARACT;  Surgeon: Sergio Oneil MD;  [facility];  Service: Ophthalmology;  Laterality: Left;    PHACOEMULSIFICATION OF CATARACT Right 11/20/2018    Procedure: PHACOEMULSIFICATION, CATARACT;  Surgeon: Sergio Oneil MD;  Location: [facility] OR 2ND FLR;  Service: Ophthalmology;  Laterality: Right;    REFRACTIVE SURGERY      2023    supracervical abdominal hysterectomy  1978    fibroids         CBC:  Lab Results   Component Value Date    WBC 6.09 04/24/2024    RBC 4.92 04/24/2024    HGB 13.2 04/24/2024    HCT 43.4 04/24/2024     04/24/2024    MCV 88 04/24/2024    MCH 26.8 (L) 04/24/2024    MCHC 30.4 (L) 04/24/2024       CMP:   Lab Results   Component Value Date     04/24/2024    K 4.6 04/24/2024     04/24/2024    CO2 26 04/24/2024    BUN 15 04/24/2024    CREATININE 0.7 04/24/2024    GLU 84 04/24/2024    CALCIUM 9.9 04/24/2024    ALBUMIN 3.8 04/24/2024    PROT 6.9 04/24/2024    ALKPHOS 79 04/24/2024    ALT 19 04/24/2024    AST 30 04/24/2024    BILITOT 0.6 04/24/2024       INR:  No  "results found for: "PT", "INR", "PROTIME", "APTT"      Diagnostic Studies:      EKG:   Results for orders placed or performed in visit on 04/24/24   EKG 12-lead    Collection Time: 04/24/24  6:33 AM   Result Value Ref Range    QRS Duration 90 ms    OHS QTC Calculation 420 ms    Narrative    Test Reason : Z01.818,    Vent. Rate : 062 BPM     Atrial Rate : 062 BPM     P-R Int : 210 ms          QRS Dur : 090 ms      QT Int : 414 ms       P-R-T Axes : 048 -11 -04 degrees     QTc Int : 420 ms    Sinus rhythm with 1st degree A-V block with Premature atrial complexes  Minimal voltage criteria for LVH, may be normal variant  Inferior infarct ,age undetermined  Abnormal ECG  When compared with ECG of 22-JAN-2023 22:17,  Premature atrial complexes are now Present  Inferior infarct is now Present  Inverted T waves have replaced nonspecific T wave abnormality in Inferior  leads  Confirmed by Ivan Hilario MD ([id number]) on 4/26/2024 5:58:21 PM    Referred By:  DR MORFIN           Confirmed By:Ivan Hilario MD        2D Echo:  Results for orders placed or performed in visit on 08/09/17   2D Echo w/ Color Flow Doppler   Result Value Ref Range    EF + QEF 60 55 - 65    Mitral Valve Regurgitation MILD     Diastolic Dysfunction No     Est. PA Systolic Pressure 19.71     Mitral Valve Mobility NORMAL     Tricuspid Valve Regurgitation TRIVIAL        Stress Test:   No results found for this or any previous visit.      Pre-op Vitals [04/30/24 1136]   BP Pulse Resp Temp SpO2   (!) 149/83 76 17 36.7 °C (98 °F) 98 %      Height Weight BMI (Calculated)     4' 10" 156 lb 32.6         Pre-op Vitals [04/30/24 1136]   BP Pulse Resp Temp SpO2   (!) 149/83 76 17 36.7 °C (98 °F) 98 %      Height Weight BMI (Calculated)     4' 10" 156 lb 32.6              Pre-op Assessment    I have reviewed the Patient Summary Reports.       I have reviewed the Medications.     Review of Systems  Anesthesia Hx:  No problems with previous Anesthesia   History of " prior surgery of interest to airway management or planning:  Previous anesthesia: MAC, Spinal   3/31/2021  Right TKA with spinal.  Procedure performed at an [facility].   Successful Spinal/Epidural level at L 3-4     2/5/2021  EGD with MAC.  Procedure performed at an [facility].    Denies Personal Hx of Anesthesia complications.                    Social:  Non-Smoker, Social Alcohol Use [employer]      Hematology/Oncology:       -- Denies Anemia:               Hematology Comments: Vitamin D deficiency      --  Cancer in past history:       Breast    left no axillary node dissection no lymphedema surgery and radiation   Oncology Comments: S/P Left Lumpectomy and XRT in 2010     EENT/Dental:  chronic allergic rhinitis Multiple Thyroid Nodules, H/O bilateral cataracts, S/P Extractions with Intraocular Lens Implants,   Vitreous detachment of both eyes,  Dry eye syndrome of both eyes,  Refractive surgery, H/O tooth extraction          Cardiovascular:  Exercise tolerance: good   Hypertension, well controlled   Denies MI.  Denies CAD.       Denies Angina.     hyperlipidemia  Denies SOTO.  ECG has been reviewed. H/O palpitations,  Varicose veins of lower extremity with edema, bilateral, Lymphedema of both lower extremities                         Pulmonary:    Denies COPD.  Denies Asthma.   Denies Shortness of breath.  Sleep Apnea, CPAP Not compliant with CPAP          Education provided regarding risk of obstructive sleep apnea            Renal/:  Renal/ Normal  Denies Chronic Renal Disease.                Hepatic/GI:      Denies GERD. Denies Liver Disease.  H/O Hernia Repair,  History of adenomatous polyp of colon          Musculoskeletal:  Arthritis   Arthrofibrosis of total knee arthroplasty,   S/P total knee replacement, right,  Bilateral knee pain, Right Rotator Cuff injury,  Other lack of coordination,   Primary osteoarthritis of right knee,  Osteoarthritis,  Swelling of hand,  Pain involving  joint of finger of left hand,  Muscle weakness                  OB/GYN/PEDS:  C-Sections X2, Uterine fibroids, S/P Hysterectomy with BSO, Pelvic floor dysfunction           Neurological:    Denies CVA. Neuromuscular Disease,   Denies Headaches. Denies Seizures.    Bilateral Carpal Tunnel Syndrome, statin-induced myositis                            Endocrine:  Diabetes, well controlled, type 2 Denies Hypothyroidism.  Obesity (BMI 30.0-34.9),  HA1C = 6.2 on 1/29/2024      Obesity / BMI > 30  Dermatological:  H/O fissure in skin of right foot, small toe, H/O cyst on back, S/P Removal,  Venous stasis dermatitis   Psych:   anxiety                 Physical Exam  General: Well nourished, Cooperative, Alert and Oriented    Airway:  Mallampati: II   Mouth Opening: Normal  TM Distance: Normal  Tongue: Normal  Neck ROM: Normal ROM    Heart:  Rate: Normal  Rhythm: Regular Rhythm        Anesthesia Plan  Type of Anesthesia, risks & benefits discussed:    Anesthesia Type: Gen Supraglottic Airway, Gen ETT  Intra-op Monitoring Plan: Standard ASA Monitors  Post Op Pain Control Plan: IV/PO Opioids PRN, peripheral nerve block and multimodal analgesia  Induction:  IV  Airway Plan: Video  Informed Consent: Informed consent signed with the Patient and all parties understand the risks and agree with anesthesia plan.  All questions answered.   ASA Score: 3  Day of Surgery Review of History & Physical: H&P Update referred to the surgeon/provider.    Ready For Surgery From Anesthesia Perspective.     .       Primary Care...

## 2025-01-06 NOTE — ED ADULT NURSE NOTE - IN THE PAST 12 MONTHS HAVE YOU USED DRUGS OTHER THAN THOSE REQUIRED FOR MEDICAL REASON?
Duplicate request for lisinopril 5mg, previously addressed. mychart sent to patient.
INTERVENTIONAL RADIOLOGY PROCEDURE NOTE    Date: 1/6/2025    Procedure:   Procedure Summary       Date: 01/06/25 Room / Location: [facility] Cardiac Cath Lab    Anesthesia Start:  Anesthesia Stop:     Procedure: IR PORT PLACEMENT Diagnosis:       Rectal cancer (HCC)      (New diagnosis of rectal adenocarcinoma)    Scheduled Providers: Fermín Camejo MD Responsible Provider:     Anesthesia Type: Not recorded ASA Status: Not recorded            Preoperative diagnosis:   1. Rectal cancer (HCC)         Postoperative diagnosis: Same.    Surgeon: Fermín Camejo MD     Assistant: None. No qualified resident was available.    Blood loss: None    Specimens: None     Findings: Uneventful placement of 8F right IJV chest wall port with tip in prox RA, available for immediate use.    Complications: None immediate.    Anesthesia: conscious sedation and local  
No

## 2025-03-09 ENCOUNTER — EMERGENCY (EMERGENCY)
Facility: HOSPITAL | Age: 35
LOS: 0 days | Discharge: HOME | End: 2025-03-09
Attending: EMERGENCY MEDICINE
Payer: COMMERCIAL

## 2025-03-09 VITALS
TEMPERATURE: 98 F | SYSTOLIC BLOOD PRESSURE: 115 MMHG | RESPIRATION RATE: 18 BRPM | HEART RATE: 82 BPM | OXYGEN SATURATION: 99 % | DIASTOLIC BLOOD PRESSURE: 60 MMHG

## 2025-03-09 VITALS
OXYGEN SATURATION: 96 % | HEART RATE: 102 BPM | WEIGHT: 171.96 LBS | DIASTOLIC BLOOD PRESSURE: 72 MMHG | RESPIRATION RATE: 22 BRPM | SYSTOLIC BLOOD PRESSURE: 112 MMHG | TEMPERATURE: 98 F

## 2025-03-09 DIAGNOSIS — Z3A.33 33 WEEKS GESTATION OF PREGNANCY: ICD-10-CM

## 2025-03-09 DIAGNOSIS — O99.891 OTHER SPECIFIED DISEASES AND CONDITIONS COMPLICATING PREGNANCY: ICD-10-CM
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
## 2025-03-09 DIAGNOSIS — R42 DIZZINESS AND GIDDINESS: ICD-10-CM

## 2025-03-09 DIAGNOSIS — M54.50 LOW BACK PAIN, UNSPECIFIED: ICD-10-CM
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
## 2025-03-09 PROCEDURE — 99283 EMERGENCY DEPT VISIT LOW MDM: CPT

## 2025-03-09 PROCEDURE — 99282 EMERGENCY DEPT VISIT SF MDM: CPT

## 2025-03-09 RX ORDER — ACETAMINOPHEN 500 MG/5ML
650 LIQUID (ML) ORAL ONCE
Refills: 0 | Status: COMPLETED | OUTPATIENT
Start: 2025-03-09 | End: 2025-03-09

## 2025-03-09 RX ADMIN — Medication 650 MILLIGRAM(S): at 21:46

## 2025-03-09 RX ADMIN — Medication 650 MILLIGRAM(S): at 21:16
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
## 2025-03-09 NOTE — ED PROVIDER NOTE - PROGRESS NOTE DETAILS
FHR  145 FF; pt advised of strict return precautions discussed at bedside. agreeable to dc. f/u with obgyn and pcp.
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
## 2025-03-09 NOTE — ED PROVIDER NOTE - PATIENT PORTAL LINK FT
You can access the FollowMyHealth Patient Portal offered by [facility] by registering at the following website: [email or web address]. By joining [facility]’s FollowMyHealth portal, you will also be able to view your health information using other applications (apps) compatible with our system.
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
## 2025-03-09 NOTE — ED PROVIDER NOTE - PROVIDER TOKENS
FREE:[LAST:[YOUR OBGYN AT [facility]],PHONE:[(   )    -],FAX:[(   )    -],FOLLOWUP:[7-10 Days]],FREE:[LAST:[your primary care doctor],PHONE:[(   )    -],FAX:[(   )    -],FOLLOWUP:[7-10 Days]]
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
## 2025-03-09 NOTE — ED PROVIDER NOTE - PHYSICAL EXAMINATION
Physical Exam    Vital Signs: I have reviewed the initial vital signs.  Constitutional: well-nourished, appears stated age, no acute distress  Eyes: Conjunctiva pink, Sclera clear, PERRLA, EOMI without pain. no nystagmus.   Cardiovascular regular rate, regular rhythm, well-perfused extremities, radial pulses equal and 2+ b/l.   Respiratory: unlabored respiratory effort, clear to auscultation bilaterally no wheezing, rales and rhonchi. pt is speaking full sentences. no accessory muscle use.   Gastrointestinal: (+) gravid, non-tender, nondistended abdomen, no pulsatile mass, no rebound, no guarding  Musculoskeletal: FROM of b/l upper and lower extremities, (+) pt has pain in the right lower back with raising the right leg up off the bed,(+) b/l lower lumbar paraspinal tenderness right > left, no palpable spinal step offs  Integumentary: warm, dry, no rash  Neurologic: awake, alert, cranial nerves II-XII grossly intact, extremities’ motor and sensory functions grossly intact. finger to nose intact. negative pronator drift. negative romberg. 5/5 strength throughout however pt has pain in the right lower extremity with movement, steady gait.   Psychiatric: appropriate mood, appropriate affect
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
## 2025-03-09 NOTE — ED PROVIDER NOTE - CARE PROVIDER_API CALL
YOUR OBGYN AT [facility],   Phone: (   )    -  Fax: (   )    -  Follow Up Time: 7-10 Days    your primary care doctor,   Phone: (   )    -  Fax: (   )    -  Follow Up Time: 7-10 Days